# Patient Record
Sex: FEMALE | Race: WHITE | Employment: FULL TIME | URBAN - METROPOLITAN AREA
[De-identification: names, ages, dates, MRNs, and addresses within clinical notes are randomized per-mention and may not be internally consistent; named-entity substitution may affect disease eponyms.]

---

## 2019-05-25 ENCOUNTER — HOSPITAL ENCOUNTER (EMERGENCY)
Age: 64
Discharge: HOME | End: 2019-05-25
Attending: EMERGENCY MEDICINE
Payer: MEDICARE

## 2019-05-25 VITALS
HEART RATE: 71 BPM | RESPIRATION RATE: 20 BRPM | OXYGEN SATURATION: 98 % | TEMPERATURE: 98.7 F | HEIGHT: 64 IN | WEIGHT: 178 LBS | BODY MASS INDEX: 30.39 KG/M2 | DIASTOLIC BLOOD PRESSURE: 68 MMHG | SYSTOLIC BLOOD PRESSURE: 139 MMHG

## 2019-05-25 DIAGNOSIS — T67.5XXA: Primary | ICD-10-CM

## 2019-05-25 LAB
A/G RATIO: 1.5 (ref 1.1–2.2)
ALBUMIN SERPL-MCNC: 4.1 G/DL (ref 3.4–5)
ALP BLD-CCNC: 61 U/L (ref 40–129)
ALT SERPL-CCNC: 20 U/L (ref 10–40)
ANION GAP SERPL CALCULATED.3IONS-SCNC: 10 MMOL/L (ref 3–16)
AST SERPL-CCNC: 17 U/L (ref 15–37)
BILIRUB SERPL-MCNC: 0.5 MG/DL (ref 0–1)
BUN BLDV-MCNC: 13 MG/DL (ref 7–20)
CALCIUM SERPL-MCNC: 8.8 MG/DL (ref 8.3–10.6)
CHLORIDE BLD-SCNC: 102 MMOL/L (ref 99–110)
CO2: 24 MMOL/L (ref 21–32)
CREAT SERPL-MCNC: 0.7 MG/DL (ref 0.6–1.2)
GFR AFRICAN AMERICAN: >60
GFR NON-AFRICAN AMERICAN: >60
GLOBULIN: 2.7 G/DL
GLUCOSE BLD-MCNC: 136 MG/DL (ref 70–99)
POTASSIUM SERPL-SCNC: 4.2 MMOL/L (ref 3.5–5.1)
SODIUM BLD-SCNC: 136 MMOL/L (ref 136–145)
TOTAL PROTEIN: 6.8 G/DL (ref 6.4–8.2)
TROPONIN: <0.01 NG/ML

## 2019-05-25 PROCEDURE — 84484 ASSAY OF TROPONIN QUANT: CPT

## 2019-05-25 PROCEDURE — 93005 ELECTROCARDIOGRAM TRACING: CPT | Performed by: EMERGENCY MEDICINE

## 2019-05-25 PROCEDURE — 99284 EMERGENCY DEPT VISIT MOD MDM: CPT

## 2019-05-25 PROCEDURE — 96360 HYDRATION IV INFUSION INIT: CPT

## 2019-05-25 PROCEDURE — 2580000003 HC RX 258: Performed by: EMERGENCY MEDICINE

## 2019-05-25 PROCEDURE — 80053 COMPREHEN METABOLIC PANEL: CPT

## 2019-05-25 RX ORDER — OXYBUTYNIN CHLORIDE 5 MG/1
5 TABLET ORAL 3 TIMES DAILY
COMMUNITY

## 2019-05-25 RX ORDER — MONTELUKAST SODIUM 10 MG/1
10 TABLET ORAL NIGHTLY
COMMUNITY

## 2019-05-25 RX ORDER — AZELASTINE HYDROCHLORIDE 137 UG/1
SPRAY, METERED NASAL
COMMUNITY

## 2019-05-25 RX ORDER — 0.9 % SODIUM CHLORIDE 0.9 %
1000 INTRAVENOUS SOLUTION INTRAVENOUS ONCE
Status: COMPLETED | OUTPATIENT
Start: 2019-05-25 | End: 2019-05-25

## 2019-05-25 RX ORDER — LEVOTHYROXINE SODIUM 88 UG/1
88 TABLET ORAL DAILY
COMMUNITY

## 2019-05-25 RX ORDER — LOSARTAN POTASSIUM 50 MG/1
50 TABLET ORAL DAILY
COMMUNITY

## 2019-05-25 RX ADMIN — SODIUM CHLORIDE 1000 ML: 9 INJECTION, SOLUTION INTRAVENOUS at 15:03

## 2019-05-25 ASSESSMENT — ENCOUNTER SYMPTOMS
COUGH: 0
CONSTIPATION: 0
ABDOMINAL PAIN: 1
DIARRHEA: 0
NAUSEA: 1
SHORTNESS OF BREATH: 0
VOMITING: 1
EYES NEGATIVE: 1

## 2019-05-25 ASSESSMENT — PAIN DESCRIPTION - LOCATION: LOCATION: HEAD

## 2019-05-25 ASSESSMENT — PAIN DESCRIPTION - ORIENTATION: ORIENTATION: OTHER (COMMENT)

## 2019-05-25 ASSESSMENT — PAIN DESCRIPTION - DESCRIPTORS: DESCRIPTORS: ACHING;HEADACHE

## 2019-05-25 ASSESSMENT — PAIN DESCRIPTION - PAIN TYPE: TYPE: ACUTE PAIN

## 2019-05-25 ASSESSMENT — PAIN SCALES - GENERAL: PAINLEVEL_OUTOF10: 3

## 2019-05-25 NOTE — ED NOTES
Reviewed d/c instructions with pt. Sig other at bedside. Patient discharged home. Gait steady. No complications.      Kate Buckley RN  05/25/19 9425

## 2019-05-25 NOTE — ED NOTES
Patient c/o getting dizzy while gardening outside, states she went inside and drank water, took a Gas-X. Patient states she vomited prior to arrival.  Pt currently alert and oriented, denies chest pain. Patient c/o 2/10 headache. Sig other at bedside.      Blas Valencia RN  05/25/19 [id number]

## 2019-05-25 NOTE — ED PROVIDER NOTES
Date ofevaluation: 5/25/2019    Chief Complaint     Dizziness      History of Present Illness     Bentley Desir is a 70 y.o. female who presents with feeling lightheaded after being outside in the heat gardening. Patient admits that she probably did not hydrate as well as she could have otherwise. She felt generally ill and then had what one episode of vomiting. She actually feels better now. She denied any chest pain pressure heaviness. She had no shortness of breath associated with this. While outside she felt a little dizzy/lightheaded which improved now that she vomited and is indoors where is cooler. She denies any diarrhea of late. She has no abdominal pain now though she did complain of some burping and belching prior to vomiting. Review of Systems     Review of Systems   Constitutional: Positive for fatigue. Negative for chills and fever. Patient was concern for heat prostration having been outside then felt lightheaded and vomited ×1. She has some epigastric abdominal discomfort which is now resolved after vomiting. HENT: Negative. Eyes: Negative. Respiratory: Negative for cough and shortness of breath. Cardiovascular: Negative for chest pain and palpitations. Gastrointestinal: Positive for abdominal pain, nausea and vomiting. Negative for constipation and diarrhea. As per HPI patient complained of some epigastric abdominal discomfort while she was outside in the heat which then resolved after vomiting. Genitourinary: Negative for dysuria, frequency, urgency, vaginal bleeding and vaginal discharge. Musculoskeletal: Negative. Skin: Negative. Neurological: Negative. Hematological: Negative for adenopathy. Psychiatric/Behavioral: Negative. All other systems reviewed and are negative. Past Medical, Surgical, Family, and SocialHistory     She has a past medical history of Acute tonsillitis and Hypertension.   She has a past surgical history that Interpretation    Interpreted by me    Rhythm: normal sinus   Rate: normal  Axis: normal  Ectopy: none  Conduction: normal  ST Segments: no acute change  T Waves: no acute change  Q Waves: none    Clinical Impression: no acute changes and normal EKG    RADIOLOGY:  No orders to display       LABS:   Results for orders placed or performed during the hospital encounter of 05/25/19   Comprehensive Metabolic Panel   Result Value Ref Range    Sodium 136 136 - 145 mmol/L    Potassium 4.2 3.5 - 5.1 mmol/L    Chloride 102 99 - 110 mmol/L    CO2 24 21 - 32 mmol/L    Anion Gap 10 3 - 16    Glucose 136 (H) 70 - 99 mg/dL    BUN 13 7 - 20 mg/dL    CREATININE 0.7 0.6 - 1.2 mg/dL    GFR Non-African American >60 >60    GFR African American >60 >60    Calcium 8.8 8.3 - 10.6 mg/dL    Total Protein 6.8 6.4 - 8.2 g/dL    Alb 4.1 3.4 - 5.0 g/dL    Albumin/Globulin Ratio 1.5 1.1 - 2.2    Total Bilirubin 0.5 0.0 - 1.0 mg/dL    Alkaline Phosphatase 61 40 - 129 U/L    ALT 20 10 - 40 U/L    AST 17 15 - 37 U/L    Globulin 2.7 g/dL   Troponin   Result Value Ref Range    Troponin <0.01 <0.01 ng/mL       ED BEDSIDE ULTRASOUND:      RECENT VITALS:  BP: 94/71, Temp: 98.7 °F (37.1 °C), Pulse: 71, Resp: 20, SpO2: 98 %     Procedures         ED Course     Nursing Notes, Past Medical Hx, Past Surgical Hx, Social Hx, Allergies, and Family Hx were reviewed. The patient was given the following medications:  Orders Placed This Encounter   Medications    0.9 % sodium chloride bolus       CONSULTS:  None    MEDICAL DECISION MAKING / ASSESSMENT / Al Márquez is a 70 y.o. female who presents after being in the heat today. She has history of hypertension and her blood pressure was slightly low here on arrival.  She was given IV fluids. Because of her nausea and feeling lightheaded EKG was performed and was unremarkable.   Electrolyte panel was unremarkable as well as BUN and creatinine showing no evidence of acute renal insufficiency or dehydration. EKG and troponin were also obtained while she was getting fluids. She denied any nausea at the time of her rehydration. Workup was unremarkable and the patient will be treated supportively and asked to follow-up with her doctor as needed. Critical Care:      Clinical Impression     1.  Heat prostration, initial encounter        Disposition     PATIENT REFERRED TO:  Diann Campbell  [address] [place] [address]  [phone]    Schedule an appointment as soon as possible for a visit in 3 days  As needed, If symptoms worsen      DISCHARGE MEDICATIONS:  New Prescriptions    No medications on file       [id number] [facility], DO  05/25/19 0298

## 2019-05-26 LAB
EKG ATRIAL RATE: 85 BPM
EKG DIAGNOSIS: NORMAL
EKG P AXIS: 61 DEGREES
EKG P-R INTERVAL: 162 MS
EKG Q-T INTERVAL: 386 MS
EKG QRS DURATION: 88 MS
EKG QTC CALCULATION (BAZETT): 459 MS
EKG R AXIS: 41 DEGREES
EKG T AXIS: 74 DEGREES
EKG VENTRICULAR RATE: 85 BPM

## 2019-05-26 PROCEDURE — 93010 ELECTROCARDIOGRAM REPORT: CPT | Performed by: INTERNAL MEDICINE
